# Patient Record
Sex: MALE | Race: WHITE
[De-identification: names, ages, dates, MRNs, and addresses within clinical notes are randomized per-mention and may not be internally consistent; named-entity substitution may affect disease eponyms.]

---

## 2020-02-24 ENCOUNTER — HOSPITAL ENCOUNTER (EMERGENCY)
Dept: HOSPITAL 74 - JER | Age: 35
Discharge: LEFT BEFORE DISCHARGE | End: 2020-02-24
Payer: COMMERCIAL

## 2020-02-24 VITALS — SYSTOLIC BLOOD PRESSURE: 132 MMHG | DIASTOLIC BLOOD PRESSURE: 86 MMHG | HEART RATE: 73 BPM

## 2020-02-24 VITALS — TEMPERATURE: 97.8 F

## 2020-02-24 VITALS — BODY MASS INDEX: 25.7 KG/M2

## 2020-02-24 DIAGNOSIS — M54.5: ICD-10-CM
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
## 2020-02-24 DIAGNOSIS — S09.8XXA: Primary | ICD-10-CM

## 2020-02-24 DIAGNOSIS — Z91.013: ICD-10-CM
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
## 2020-02-26 ENCOUNTER — HOSPITAL ENCOUNTER (EMERGENCY)
Dept: HOSPITAL 74 - JERFT | Age: 35
Discharge: HOME | End: 2020-02-26
Payer: COMMERCIAL

## 2020-02-26 VITALS — BODY MASS INDEX: 25.7 KG/M2

## 2020-02-26 VITALS — HEART RATE: 80 BPM | TEMPERATURE: 97.9 F | SYSTOLIC BLOOD PRESSURE: 120 MMHG | DIASTOLIC BLOOD PRESSURE: 67 MMHG

## 2020-02-26 DIAGNOSIS — Z91.013: ICD-10-CM

## 2020-02-26 DIAGNOSIS — R68.84: Primary | ICD-10-CM
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
## 2021-07-23 ENCOUNTER — HOSPITAL ENCOUNTER (EMERGENCY)
Dept: HOSPITAL 74 - JERFT | Age: 36
Discharge: HOME | End: 2021-07-23
Payer: COMMERCIAL

## 2021-07-23 VITALS — TEMPERATURE: 97.6 F | SYSTOLIC BLOOD PRESSURE: 147 MMHG | DIASTOLIC BLOOD PRESSURE: 71 MMHG | HEART RATE: 83 BPM

## 2021-07-23 VITALS — BODY MASS INDEX: 23.7 KG/M2

## 2021-07-23 DIAGNOSIS — S61.212A: Primary | ICD-10-CM

## 2021-07-30 ENCOUNTER — HOSPITAL ENCOUNTER (EMERGENCY)
Dept: HOSPITAL 74 - JERFT | Age: 36
Discharge: HOME | End: 2021-07-30
Payer: COMMERCIAL

## 2021-07-30 VITALS — SYSTOLIC BLOOD PRESSURE: 113 MMHG | HEART RATE: 76 BPM | DIASTOLIC BLOOD PRESSURE: 68 MMHG | TEMPERATURE: 97.8 F

## 2021-07-30 VITALS — BODY MASS INDEX: 23.1 KG/M2

## 2021-07-30 DIAGNOSIS — Z48.00: Primary | ICD-10-CM

## 2023-04-06 ENCOUNTER — HOSPITAL ENCOUNTER (EMERGENCY)
Dept: HOSPITAL 74 - JER | Age: 38
Discharge: HOME | End: 2023-04-06
Payer: COMMERCIAL

## 2023-04-06 VITALS
HEART RATE: 99 BPM | TEMPERATURE: 98 F | DIASTOLIC BLOOD PRESSURE: 69 MMHG | SYSTOLIC BLOOD PRESSURE: 109 MMHG | RESPIRATION RATE: 18 BRPM

## 2023-04-06 VITALS — BODY MASS INDEX: 22.4 KG/M2

## 2023-04-06 DIAGNOSIS — J70.5: ICD-10-CM

## 2023-04-06 DIAGNOSIS — T59.811A: Primary | ICD-10-CM

## 2025-03-22 ENCOUNTER — HOSPITAL ENCOUNTER (EMERGENCY)
Dept: HOSPITAL 74 - JER | Age: 40
Discharge: HOME | End: 2025-03-22
Payer: COMMERCIAL

## 2025-03-22 VITALS
SYSTOLIC BLOOD PRESSURE: 132 MMHG | RESPIRATION RATE: 16 BRPM | HEART RATE: 96 BPM | DIASTOLIC BLOOD PRESSURE: 80 MMHG | TEMPERATURE: 98.9 F

## 2025-03-22 VITALS — BODY MASS INDEX: 22.4 KG/M2

## 2025-03-22 DIAGNOSIS — M79.662: ICD-10-CM
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
## 2025-03-22 DIAGNOSIS — M25.512: Primary | ICD-10-CM
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
## 2025-03-22 RX ADMIN — IBUPROFEN ONE MG: 600 TABLET, FILM COATED ORAL at 20:43

## 2025-03-22 RX ADMIN — ACETAMINOPHEN ONE MG: 500 TABLET, FILM COATED ORAL at 20:44

## 2025-03-22 RX ADMIN — METHOCARBAMOL ONE MG: 500 TABLET ORAL at 20:44
